# Patient Record
Sex: FEMALE | Race: BLACK OR AFRICAN AMERICAN | Employment: FULL TIME | URBAN - METROPOLITAN AREA
[De-identification: names, ages, dates, MRNs, and addresses within clinical notes are randomized per-mention and may not be internally consistent; named-entity substitution may affect disease eponyms.]

---

## 2017-01-25 ENCOUNTER — OP HISTORICAL/CONVERTED ENCOUNTER (OUTPATIENT)
Dept: OTHER | Age: 62
End: 2017-01-25

## 2018-08-17 RX ORDER — OMEPRAZOLE 20 MG/1
20 CAPSULE, DELAYED RELEASE ORAL
COMMUNITY

## 2018-08-17 RX ORDER — AMLODIPINE AND VALSARTAN 5; 160 MG/1; MG/1
1 TABLET ORAL DAILY
COMMUNITY

## 2018-08-17 RX ORDER — GABAPENTIN 300 MG/1
300 CAPSULE ORAL EVERY EVENING
COMMUNITY

## 2018-08-17 NOTE — PERIOP NOTES
[address]  Endoscopy Preprocedure Instructions      1. On the day of your surgery, please report to registration located on the 2nd floor of the  [facility]. yes    2. You must have a responsible adult to drive you to the hospital, stay at the hospital during your procedure and drive you home. If they leave your procedure will not be started (no exceptions). yes    3. Do not have anything to eat or drink (including water, gum, mints, coffee, and juice) after midnight. This does not apply to the medications you were instructed to take by your physician. yesIf you are currently taking Plavix, Coumadin, Aspirin, or other blood-thinning agents, contact your physician for special instructions. not applicable,    4. If you are having a procedure that requires bowel prep: We recommend that you have only clear liquids the day before your procedure and begin your bowel prep by 5:00 pm.  You may continue to drink clear liquids until midnight. If for any reason you are not able to complete your prep please notify your physician immediately. yes    5. Have a list of all current medications, including vitamins, herbal supplements and any other over the counter medications. yes    6. If you wear glasses, contacts, dentures and/or hearing aids, they may be removed prior to procedure, please bring a case to store them in. yes    7. You should understand that if you do not follow these instructions your procedure may be cancelled. If your physical condition changes (I.e. fever, cold or flu) please contact your doctor as soon as possible. 8. It is important that you be on time.   If for any reason you are unable to keep your appointment please call [phone] the day of or your physicians office prior to your scheduled procedure

## 2018-08-21 NOTE — H&P
57 y.o. female for open access colonoscopy for screening   Additional data for completion of the targeted pre-endoscopy H&P will be provided under 'H&P interval notes'. Please see that document which will be attached to this.   Laurie Bain MD    Last colon 2017 JOSE DE JESUS inadequate prep

## 2018-08-22 ENCOUNTER — HOSPITAL ENCOUNTER (OUTPATIENT)
Age: 63
Setting detail: OUTPATIENT SURGERY
Discharge: HOME | End: 2018-08-22
Attending: SPECIALIST | Admitting: SPECIALIST
Payer: COMMERCIAL

## 2018-08-22 ENCOUNTER — ANESTHESIA EVENT (OUTPATIENT)
Dept: ENDOSCOPY | Age: 63
End: 2018-08-22
Payer: COMMERCIAL

## 2018-08-22 ENCOUNTER — ANESTHESIA (OUTPATIENT)
Dept: ENDOSCOPY | Age: 63
End: 2018-08-22
Payer: COMMERCIAL

## 2018-08-22 VITALS
HEART RATE: 63 BPM | WEIGHT: 230 LBS | BODY MASS INDEX: 36.96 KG/M2 | OXYGEN SATURATION: 99 % | SYSTOLIC BLOOD PRESSURE: 113 MMHG | RESPIRATION RATE: 29 BRPM | TEMPERATURE: 97.6 F | HEIGHT: 66 IN | DIASTOLIC BLOOD PRESSURE: 64 MMHG

## 2018-08-22 PROCEDURE — 76060000031 HC ANESTHESIA FIRST 0.5 HR: Performed by: SPECIALIST

## 2018-08-22 PROCEDURE — 74011250636 HC RX REV CODE- 250/636

## 2018-08-22 PROCEDURE — 76040000019: Performed by: SPECIALIST

## 2018-08-22 RX ORDER — PROPOFOL 10 MG/ML
INJECTION, EMULSION INTRAVENOUS AS NEEDED
Status: DISCONTINUED | OUTPATIENT
Start: 2018-08-22 | End: 2018-08-22 | Stop reason: HOSPADM

## 2018-08-22 RX ORDER — SODIUM CHLORIDE 9 MG/ML
INJECTION, SOLUTION INTRAVENOUS
Status: DISCONTINUED | OUTPATIENT
Start: 2018-08-22 | End: 2018-08-22 | Stop reason: HOSPADM

## 2018-08-22 RX ORDER — FLUMAZENIL 0.1 MG/ML
0.2 INJECTION INTRAVENOUS
Status: DISCONTINUED | OUTPATIENT
Start: 2018-08-22 | End: 2018-08-22 | Stop reason: HOSPADM

## 2018-08-22 RX ORDER — PROPOFOL 10 MG/ML
INJECTION, EMULSION INTRAVENOUS
Status: DISCONTINUED | OUTPATIENT
Start: 2018-08-22 | End: 2018-08-22 | Stop reason: HOSPADM

## 2018-08-22 RX ORDER — DEXTROMETHORPHAN/PSEUDOEPHED 2.5-7.5/.8
1.2 DROPS ORAL
Status: DISCONTINUED | OUTPATIENT
Start: 2018-08-22 | End: 2018-08-22 | Stop reason: HOSPADM

## 2018-08-22 RX ORDER — SODIUM CHLORIDE 9 MG/ML
50 INJECTION, SOLUTION INTRAVENOUS CONTINUOUS
Status: DISCONTINUED | OUTPATIENT
Start: 2018-08-22 | End: 2018-08-22 | Stop reason: HOSPADM

## 2018-08-22 RX ORDER — MIDAZOLAM HYDROCHLORIDE 1 MG/ML
.25-5 INJECTION, SOLUTION INTRAMUSCULAR; INTRAVENOUS AS NEEDED
Status: DISCONTINUED | OUTPATIENT
Start: 2018-08-22 | End: 2018-08-22 | Stop reason: HOSPADM

## 2018-08-22 RX ORDER — ATROPINE SULFATE 0.1 MG/ML
0.5 INJECTION INTRAVENOUS
Status: DISCONTINUED | OUTPATIENT
Start: 2018-08-22 | End: 2018-08-22 | Stop reason: HOSPADM

## 2018-08-22 RX ORDER — NALOXONE HYDROCHLORIDE 0.4 MG/ML
0.4 INJECTION, SOLUTION INTRAMUSCULAR; INTRAVENOUS; SUBCUTANEOUS
Status: DISCONTINUED | OUTPATIENT
Start: 2018-08-22 | End: 2018-08-22 | Stop reason: HOSPADM

## 2018-08-22 RX ORDER — EPINEPHRINE 0.1 MG/ML
1 INJECTION INTRACARDIAC; INTRAVENOUS
Status: DISCONTINUED | OUTPATIENT
Start: 2018-08-22 | End: 2018-08-22 | Stop reason: HOSPADM

## 2018-08-22 RX ORDER — FENTANYL CITRATE 50 UG/ML
25 INJECTION, SOLUTION INTRAMUSCULAR; INTRAVENOUS AS NEEDED
Status: DISCONTINUED | OUTPATIENT
Start: 2018-08-22 | End: 2018-08-22 | Stop reason: HOSPADM

## 2018-08-22 RX ADMIN — PROPOFOL 140 MCG/KG/MIN: 10 INJECTION, EMULSION INTRAVENOUS at 09:17

## 2018-08-22 RX ADMIN — PROPOFOL 30 MG: 10 INJECTION, EMULSION INTRAVENOUS at 09:25

## 2018-08-22 RX ADMIN — PROPOFOL 70 MG: 10 INJECTION, EMULSION INTRAVENOUS at 09:17

## 2018-08-22 RX ADMIN — PROPOFOL 30 MG: 10 INJECTION, EMULSION INTRAVENOUS at 09:22

## 2018-08-22 RX ADMIN — SODIUM CHLORIDE: 9 INJECTION, SOLUTION INTRAVENOUS at 09:11

## 2018-08-22 RX ADMIN — PROPOFOL 30 MG: 10 INJECTION, EMULSION INTRAVENOUS at 09:27

## 2018-08-22 RX ADMIN — PROPOFOL 50 MG: 10 INJECTION, EMULSION INTRAVENOUS at 09:20

## 2018-08-22 NOTE — PROCEDURES
[address] [facility] GARRISON Valadez MD  [phone]      2018    Colonoscopy Procedure Note  [facility]  :  1955  [place] Medical Record Number: [id number]    Indications:     Screening colonoscopy  PCP:  Carolann Espinoza MD  Anesthesia/Sedation: Conscious Sedation/Moderate Sedation  Endoscopist:  Dr. Darrell Lnido  Complications:  None  Estimated Blood Loss:  None    Permit:  The indications, risks, benefits and alternatives were reviewed with the patient or their decision maker who was provided an opportunity to ask questions and all questions were answered. The specific risks of colonoscopy with conscious sedation were reviewed, including but not limited to anesthetic complication, bleeding, adverse drug reaction, missed lesion, infection, IV site reactions, and intestinal perforation which would lead to the need for surgical repair. Alternatives to colonoscopy including radiographic imaging, observation without testing, or laboratory testing were reviewed including the limitations of those alternatives. After considering the options and having all their questions answered, the patient or their decision maker provided both verbal and written consent to proceed. Procedure in Detail:  After obtaining informed consent, positioning of the patient in the left lateral decubitus position, and conduction of a pre-procedure pause or \"time out\" the endoscope was introduced into the anus and advanced to the cecum, which was identified by the ileocecal valve and appendiceal orifice. The quality of the colonic preparation was good. A careful inspection was made as the colonoscope was withdrawn, findings and interventions are described below. Findings: There is diverticulosis in the sigmoid colon without complications such as bleeding, inflammatory change, or luminal narrowing.   In the rectum, medium internal hemorrhoids are noted without bleeding. No polyps or other neoplasia. Specimens:    none    Complications:   None; patient tolerated the procedure well. Impression:  Normal colonoscopy to the cecum, with no evidence of neoplasia, diverticular disease, or mucosal abnormality. Recommendations:     - For colon cancer screening in this average-risk patient, colonoscopy may be repeated in 10 years. Thank you for entrusting me with this patient's care. Please do not hesitate to contact me with any questions or if I can be of assistance with any of your other patients' GI needs.     Signed By: Nakul Salas MD                        August 22, 2018

## 2018-08-22 NOTE — ANESTHESIA POSTPROCEDURE EVALUATION
Post-Anesthesia Evaluation and Assessment    Patient: Sarah Chambers MRN: [id number]  SSN: [id number]    YOB: 1955  Age: 57 y.o. Sex: female       Cardiovascular Function/Vital Signs  Visit Vitals    /64    Pulse 63    Temp 36.4 °C (97.6 °F)    Resp 29    Ht 5' 6\" (1.676 m)    Wt 104.3 kg (230 lb)    SpO2 99%    BMI 37.12 kg/m2       Patient is status post MAC anesthesia for Procedure(s):  COLONOSCOPY. Nausea/Vomiting: None    Postoperative hydration reviewed and adequate. Pain:  Pain Scale 1: Numeric (0 - 10) (08/22/18 0945)  Pain Intensity 1: 0 (08/22/18 0945)   Managed    Neurological Status: At baseline    Mental Status and Level of Consciousness: Arousable    Pulmonary Status:   O2 Device: Room air (08/22/18 0945)   Adequate oxygenation and airway patent    Complications related to anesthesia: None    Post-anesthesia assessment completed.  No concerns    Signed By: Claudine Goins MD     August 22, 2018

## 2018-08-22 NOTE — ROUTINE PROCESS
Antonio Shira  1955  [id number]    Situation:  Verbal report received from: Raeann Morgan RN  Procedure: Procedure(s):  COLONOSCOPY    Background:    Preoperative diagnosis: SCREENING COLONOSCOPY FOR COLONIC NEOPLASIA   Postoperative diagnosis: Diverticulosis, hemorrhoids    :  Dr. Jeannine Sheets  Assistant(s): Endoscopy Technician-1: Nelson Franco  Endoscopy RN-1: Raeann Morgan RN    Specimens: * No specimens in log *  H. Pylori  no    Assessment:  Intra-procedure medications   Anesthesia gave intra-procedure sedation and medications, see anesthesia flow sheet yes    Intravenous fluids: NS@ KVO     Vital signs stable     Abdominal assessment: round and soft     Recommendation:  Discharge patient per MD order  Family or Friend   Permission to share finding with family or friend yes

## 2018-08-22 NOTE — IP AVS SNAPSHOT
[address] 
[phone] Patient: Brenden Mckenzie MRN: [id number] :1955 About your hospitalization You were admitted on:  2018 You last received care in the:  [facility] ENDOSCOPY You were discharged on:  2018 Why you were hospitalized Your primary diagnosis was:  Not on File Follow-up Information None Your Scheduled Appointments 2018 COLONOSCOPY with Nakul Salas MD  
[facility] ENDOSCOPY (RI OR PRE ASSESSMENT)  
 [address]  
[phone] Park in designated visitor/patient parking. Enter through the main entrance, which is just to the left of the fountain. Once inside, go around the corner to the left. You will register in Outpatient Registration. Park in designated visitor/patient parking. Enter through the main entrance, which is just to the left of the fountain. Once inside, go around the corner to the left. You will register in Outpatient Registration. Discharge Orders None A check luda indicates which time of day the medication should be taken. My Medications CONTINUE taking these medications Instructions Each Dose to Equal  
 Morning Noon Evening Bedtime ADVAIR DISKUS 100-50 mcg/dose diskus inhaler Generic drug:  fluticasone-salmeterol Your last dose was: Your next dose is: Take 1 Puff by inhalation nightly. 1 Puff  
    
   
   
   
  
 albuterol 90 mcg/actuation inhaler Commonly known as:  PROVENTIL HFA, VENTOLIN HFA, PROAIR HFA Your last dose was: Your next dose is: Take 1 Puff by inhalation daily. 1 Puff EXFORGE 5-160 mg per tablet Generic drug:  amLODIPine-valsartan Your last dose was: Your next dose is: Take 1 Tab by mouth daily. 1 Tab  
    
   
   
   
  
 gabapentin 300 mg capsule Commonly known as:  NEURONTIN Your last dose was: Your next dose is: Take 300 mg by mouth three (3) times daily. Takes 1-2 times as needed 300 mg  
    
   
   
   
  
 omeprazole 20 mg capsule Commonly known as:  PRILOSEC Your last dose was: Your next dose is: Take 20 mg by mouth daily as needed. 20 mg  
    
   
   
   
  
 SINGULAIR 10 mg tablet Generic drug:  montelukast  
   
Your last dose was: Your next dose is: Take 10 mg by mouth daily. 10 mg Discharge Instructions [address]. Dev Berumen, [address] [place] [address] 
[phone] August 22, 2018 Torin Kemp YOB: 1955 COLONOSCOPY DISCHARGE INSTRUCTIONS If there is redness at IV site you should apply warm compress to area. If redness or soreness persist contact Dr. Dev Berumen' or your primary care doctor. There may be a slight amount of blood passed from the rectum. Gaseous discomfort may develop, but walking, belching will help relieve this. You may not operate a vehicle for 12 hours You may not operate machinery or dangerous appliances for rest of today You may not drink alcoholic beverages for 12 hours Avoid making any critical decisions for 24 hours DIET: 
You may resume your normal diet, but some patients find that heavy or large meals may lead to indigestion or vomiting. I suggest a light meal as first food intake.  
 
MEDICATIONS: 
The use of some over-the-counter pain medication may lead to bleeding after colon biopsies or polyp removal.  Tylenol (also called acetaminophen) is safe to take even if you have had colonoscopy with polyp removal.  Based on the procedure you had today you may safely take aspirin or aspirin-like products for the next ten (10) days. Remember that Tylenol (also called acetaminophen) is safe to take after colonoscopy even if you have had biopsies or polyps removed. ACTIVITY: 
You may resume your normal household activities, but it is recommended that you spend the remainder of the day resting -  avoid any strenuous activity. CALL DR. Mode Wilburn' OFFICE IF: Increasing pain, nausea, vomiting Abdominal distension (swelling) Significant new or increased bleeding (oral or rectal) Fever/Chills Chest pain/shortness of breath Additional instructions:  
Great news, no polyps or other serious problems identified today You should enjoy a diet high in fiber. Next colonoscopy 10 years. It was an honor to be your doctor today. Please let me or my office staff know if you have any feedback about today's procedure. Drake Adkins MD 
 
Colonoscopy saves lives, and can prevent colon cancer. Everyone aged 48 or older needs colonoscopy. Tell your family and friends: get the test! 
 
 
 
 
  
  
  
Introducing [facility] 24/7 As a [facility] patient, I wanted to make you aware of our electronic visit tool called [facility]. [facility] 24/7 allows you to connect within minutes with a medical provider 24 hours a day, seven days a week via a mobile device or tablet or logging into a secure website from your computer. You can access [facility]to Paredesfin from anywhere in the [place]. A virtual visit might be right for you when you have a simple condition and feel like you just dont want to get out of bed, or cant get away from work for an appointment, when your regular [facility] provider is not available (evenings, weekends or holidays), or when youre out of town and need minor care.   Electronic visits cost only $49 and if the Luis Alberto Otto provider determines a prescription is needed to treat your condition, one can be electronically transmitted to a nearby pharmacy*. Please take a moment to enroll today if you have not already done so. The enrollment process is free and takes just a few minutes. To enroll, please download the [employer] 24/7 victor manuel to your tablet or phone, or visit [email or web address] to enroll on your computer. And, as an [address] [place] [address] patient with a [facility] account, the results of your visits will be scanned into your electronic medical record and your primary care provider will be able to view the scanned results. We urge you to continue to see your regular [employer] provider for your ongoing medical care. And while your primary care provider may not be the one available when you seek a [facility] virtual visit, the peace of mind you get from getting a real diagnosis real time can be priceless. For more information on [facility], view our Frequently Asked Questions (FAQs) at [email or web address]. Sincerely, 
 
Vicki Webster MD 
Chief Medical Officer [address] [place] [address] *:  certain medications cannot be prescribed via [facility] Providers Seen During Your Hospitalization Provider Specialty Primary office phone Wojciech Thomas MD Gastroenterology [phone] Your Primary Care Physician (PCP) Primary Care Physician Office Phone Office Fax Alphonso Suazo [phone] You are allergic to the following Allergen Reactions Aspirin Other (comments) Only while on warfarin Recent Documentation Height Weight BMI OB Status Smoking Status 1.676 m 104.3 kg 37.12 kg/m2 Ablation Never Smoker Emergency Contacts Name Discharge Info Relation Home Work Mobile Harpreet Matt DISCHARGE CAREGIVER [3] Spouse [3] [phone] Patient Belongings The following personal items are in your possession at time of discharge: Dental Appliances: None  Visual Aid: At bedside, Glasses Please provide this summary of care documentation to your next provider. Signatures-by signing, you are acknowledging that this After Visit Summary has been reviewed with you and you have received a copy. Patient Signature:  ____________________________________________________________ Date:  ____________________________________________________________  
  
[facility] Provider Signature:  ____________________________________________________________ Date:  ____________________________________________________________

## 2018-08-22 NOTE — PERIOP NOTES
Patient tolerated procedure without problems. Abdomen soft and patient arousable and voices no complaints Report received from CRNA, see anesthesia note. Patient transported to endoscopy recovery area.   Endoscope was pre-cleaned at bedside immediately following procedure by Liudmila ZULETA

## 2018-08-22 NOTE — DISCHARGE INSTRUCTIONS
[address] [facility] GARRISON Coffey MD  [phone]      August 22, 2018    Aleksandr Mendoza  YOB: 1955    COLONOSCOPY DISCHARGE INSTRUCTIONS    If there is redness at IV site you should apply warm compress to area. If redness or soreness persist contact Dr. Moises Coffey' or your primary care doctor. There may be a slight amount of blood passed from the rectum. Gaseous discomfort may develop, but walking, belching will help relieve this. You may not operate a vehicle for 12 hours  You may not operate machinery or dangerous appliances for rest of today  You may not drink alcoholic beverages for 12 hours  Avoid making any critical decisions for 24 hours    DIET:  You may resume your normal diet, but some patients find that heavy or large meals may lead to indigestion or vomiting. I suggest a light meal as first food intake. MEDICATIONS:  The use of some over-the-counter pain medication may lead to bleeding after colon biopsies or polyp removal.  Tylenol (also called acetaminophen) is safe to take even if you have had colonoscopy with polyp removal.  Based on the procedure you had today you may safely take aspirin or aspirin-like products for the next ten (10) days. Remember that Tylenol (also called acetaminophen) is safe to take after colonoscopy even if you have had biopsies or polyps removed. ACTIVITY:  You may resume your normal household activities, but it is recommended that you spend the remainder of the day resting -  avoid any strenuous activity. CALL DR. Leo Garcia' OFFICE IF:  Increasing pain, nausea, vomiting  Abdominal distension (swelling)  Significant new or increased bleeding (oral or rectal)  Fever/Chills  Chest pain/shortness of breath                       Additional instructions:   Great news, no polyps or other serious problems identified today  You should enjoy a diet high in fiber.   Next colonoscopy 10 years. It was an honor to be your doctor today. Please let me or my office staff know if you have any feedback about today's procedure. David Zuluaga MD    Colonoscopy saves lives, and can prevent colon cancer. Everyone aged 48 or older needs colonoscopy.   Tell your family and friends: get the test!

## 2018-08-22 NOTE — INTERVAL H&P NOTE
Pre-Endoscopy H&P Update  Chief complaint/HPI/ROS:  The indication for the procedure, the patient's history and the patient's current medications are reviewed prior to the procedure and that data is reported on the H&P to which this document is attached. Any significant complaints with regard to organ systems will be noted, and if not mentioned then a review of systems is not contributory. Past Medical History:   Diagnosis Date    Asthma     GERD (gastroesophageal reflux disease)     Hypertension     Sleep apnea     cpap    Thromboembolus [facility])       Past Surgical History:   Procedure Laterality Date    HX GYN      Nova Sure Ablation    HX GYN  2003    hysteroscopy     HX OTHER SURGICAL  2017    colonoscopy    HX TUBAL LIGATION      VASCULAR SURGERY PROCEDURE UNLIST      blood clot in leg, vein closure     Social   Social History   Substance Use Topics    Smoking status: Never Smoker    Smokeless tobacco: Never Used    Alcohol use No      Family History   Problem Relation Age of Onset    Cancer Mother      multiple myeloma    Heart Disease Mother       Allergies   Allergen Reactions    Aspirin Other (comments)     Only while on warfarin      Prior to Admission Medications   Prescriptions Last Dose Informant Patient Reported? Taking? albuterol (PROVENTIL HFA, VENTOLIN HFA) 90 mcg/Actuation inhaler 8/21/2018 at Unknown time  Yes Yes   Sig: Take 1 Puff by inhalation daily. amLODIPine-valsartan (EXFORGE) 5-160 mg per tablet 8/21/2018 at Unknown time  Yes Yes   Sig: Take 1 Tab by mouth daily. fluticasone-salmeterol (ADVAIR DISKUS) 100-50 mcg/dose diskus inhaler 8/21/2018 at Unknown time  Yes Yes   Sig: Take 1 Puff by inhalation nightly.   gabapentin (NEURONTIN) 300 mg capsule 8/21/2018 at Unknown time  Yes Yes   Sig: Take 300 mg by mouth three (3) times daily. Takes 1-2 times as needed   montelukast (SINGULAIR) 10 mg tablet 8/21/2018 at Unknown time  Yes Yes   Sig: Take 10 mg by mouth daily. omeprazole (PRILOSEC) 20 mg capsule 8/20/2018  Yes Yes   Sig: Take 20 mg by mouth daily as needed. Facility-Administered Medications: None       PHYSICAL EXAM:  The patient is examined immediately prior to the procedure. Visit Vitals    /74    Pulse 71    Temp 98.2 °F (36.8 °C)    Resp 20    Ht 5' 6\" (1.676 m)    Wt 104.3 kg (230 lb)    SpO2 96%    BMI 37.12 kg/m2     Gen: Appears comfortable, no distress. Pulm: comfortable respirations with no abnormal audible breath sounds  CV: heart regular, well perfused  GI: abdomen flat. PLAN:  Informed consent discussion held, patient afforded an opportunity to ask questions and all questions answered. After being advised of the risks, benefits, and alternatives, the patient requested that we proceed and indicated so on a written consent form. Will proceed with procedure as planned.   Homa Villa MD

## 2018-08-22 NOTE — ANESTHESIA PREPROCEDURE EVALUATION
Anesthetic History               Review of Systems / Medical History  Patient summary reviewed and pertinent labs reviewed    Pulmonary        Sleep apnea    Asthma        Neuro/Psych              Cardiovascular    Hypertension              Exercise tolerance: >4 METS     GI/Hepatic/Renal     GERD           Endo/Other        Morbid obesity     Other Findings              Physical Exam    Airway  Mallampati: I  TM Distance: 4 - 6 cm  Neck ROM: normal range of motion   Mouth opening: Normal     Cardiovascular    Rhythm: regular  Rate: normal         Dental    Dentition: Caps/crowns, Lower dentition intact and Upper dentition intact     Pulmonary  Breath sounds clear to auscultation               Abdominal  GI exam deferred       Other Findings            Anesthetic Plan    ASA: 3  Anesthesia type: MAC            Anesthetic plan and risks discussed with: Patient

## 2019-03-08 ENCOUNTER — OP HISTORICAL/CONVERTED ENCOUNTER (OUTPATIENT)
Dept: OTHER | Age: 64
End: 2019-03-08

## 2021-09-03 ENCOUNTER — OFFICE VISIT (OUTPATIENT)
Dept: ENDOCRINOLOGY | Age: 66
End: 2021-09-03
Payer: MEDICARE

## 2021-09-03 VITALS
OXYGEN SATURATION: 97 % | DIASTOLIC BLOOD PRESSURE: 73 MMHG | BODY MASS INDEX: 40.27 KG/M2 | RESPIRATION RATE: 16 BRPM | WEIGHT: 250.6 LBS | HEIGHT: 66 IN | HEART RATE: 67 BPM | TEMPERATURE: 98.3 F | SYSTOLIC BLOOD PRESSURE: 146 MMHG

## 2021-09-03 DIAGNOSIS — E11.65 UNCONTROLLED TYPE 2 DIABETES MELLITUS WITH HYPERGLYCEMIA (HCC): Primary | ICD-10-CM

## 2021-09-03 DIAGNOSIS — E78.2 MIXED HYPERLIPIDEMIA: ICD-10-CM

## 2021-09-03 DIAGNOSIS — I10 ESSENTIAL HYPERTENSION: ICD-10-CM

## 2021-09-03 LAB — HBA1C MFR BLD HPLC: 7 %

## 2021-09-03 PROCEDURE — 3051F HG A1C>EQUAL 7.0%<8.0%: CPT | Performed by: INTERNAL MEDICINE

## 2021-09-03 PROCEDURE — 1101F PT FALLS ASSESS-DOCD LE1/YR: CPT | Performed by: INTERNAL MEDICINE

## 2021-09-03 PROCEDURE — 3017F COLORECTAL CA SCREEN DOC REV: CPT | Performed by: INTERNAL MEDICINE

## 2021-09-03 PROCEDURE — G8510 SCR DEP NEG, NO PLAN REQD: HCPCS | Performed by: INTERNAL MEDICINE

## 2021-09-03 PROCEDURE — 83036 HEMOGLOBIN GLYCOSYLATED A1C: CPT | Performed by: INTERNAL MEDICINE

## 2021-09-03 PROCEDURE — 2022F DILAT RTA XM EVC RTNOPTHY: CPT | Performed by: INTERNAL MEDICINE

## 2021-09-03 PROCEDURE — 99204 OFFICE O/P NEW MOD 45 MIN: CPT | Performed by: INTERNAL MEDICINE

## 2021-09-03 PROCEDURE — G8536 NO DOC ELDER MAL SCRN: HCPCS | Performed by: INTERNAL MEDICINE

## 2021-09-03 PROCEDURE — G8427 DOCREV CUR MEDS BY ELIG CLIN: HCPCS | Performed by: INTERNAL MEDICINE

## 2021-09-03 PROCEDURE — 1090F PRES/ABSN URINE INCON ASSESS: CPT | Performed by: INTERNAL MEDICINE

## 2021-09-03 PROCEDURE — G8400 PT W/DXA NO RESULTS DOC: HCPCS | Performed by: INTERNAL MEDICINE

## 2021-09-03 PROCEDURE — G8417 CALC BMI ABV UP PARAM F/U: HCPCS | Performed by: INTERNAL MEDICINE

## 2021-09-03 RX ORDER — METFORMIN HYDROCHLORIDE 500 MG/1
500 TABLET ORAL 2 TIMES DAILY WITH MEALS
Qty: 180 TABLET | Refills: 3 | Status: SHIPPED | OUTPATIENT
Start: 2021-09-03 | End: 2022-09-03

## 2021-09-03 RX ORDER — DESLORATADINE 5 MG/1
TABLET ORAL
COMMUNITY
End: 2021-09-03

## 2021-09-03 RX ORDER — MELATONIN: COMMUNITY

## 2021-09-03 RX ORDER — LORATADINE 10 MG/1
TABLET ORAL
COMMUNITY

## 2021-09-03 RX ORDER — BUDESONIDE AND FORMOTEROL FUMARATE DIHYDRATE 160; 4.5 UG/1; UG/1
AEROSOL RESPIRATORY (INHALATION)
COMMUNITY
Start: 2021-08-16

## 2021-09-03 RX ORDER — ATORVASTATIN CALCIUM 10 MG/1
10 TABLET, FILM COATED ORAL DAILY
COMMUNITY
Start: 2021-03-31 | End: 2022-07-12

## 2021-09-03 RX ORDER — METOPROLOL SUCCINATE 25 MG/1
12.5 TABLET, EXTENDED RELEASE ORAL DAILY
COMMUNITY

## 2021-09-03 RX ORDER — METFORMIN HYDROCHLORIDE 500 MG/1
500 TABLET ORAL 2 TIMES DAILY
COMMUNITY
Start: 2020-12-18 | End: 2021-09-03 | Stop reason: SDUPTHER

## 2021-09-03 NOTE — PROGRESS NOTES
1. Have you been to the ER, urgent care clinic since your last visit? Hospitalized since your last visit? No    2. Have you seen or consulted any other health care providers outside of the [address] [place] [address] since your last visit? Include any pap smears or colon screening.  No

## 2021-09-03 NOTE — LETTER
9/5/2021    Patient: Vanessa Valente   YOB: 1955   Date of Visit: 9/3/2021     Mary Silveira [facility] [address]  Via Fax: [phone]    Dear DELL Silveira,      Thank you for referring Ms. Starla Dia to [facility]OLOGY for evaluation. My notes for this consultation are attached. If you have questions, please do not hesitate to call me. I look forward to following your patient along with you.       Sincerely,    Fan Livingston MD

## 2021-09-03 NOTE — PATIENT INSTRUCTIONS
SPECIFIC INSTRUCTIONS BELOW       STAY ON  METFORMIN AS BEFORE     USE FOREST 2 SYSTEM -  WILL BUY IT         -----------------------------------------------------------------------------        DRINK water   Do not skip meals  Do not eat in between meals    Reduce carbs- pasta, rice, potatoes, bread   Try to avoid processed bread products like BISCUITS, CROISSANTS, MUFFINS    Do not drink juices or sodas, even if they are calorie zero or diet drinks and especially avoid using powders like crystalloids , KHALIDA-AIDS     Do not eat peanut butter     Do not eat sugar free cookies and cakes   Do not eat peaches, oranges, pineapples, raisins, grapes , canteloupe , honey dew, mangoes , watermelon  and fruit medleys        -------------PAY ATTENTION TO THESE GENERAL INSTRUCTIONS -----------------    -ANY tests other than blood work, which you opt to do  outside the  [facility] imaging facilities, you are responsible for prior authorizations if  required    - HEALTH MAINTENANCE IS NOT GOING TO BE UP TO DATE ON YOUR AVS- PLEASE IGNORE   - YOUR MED LIST IS NOT UP TO DATE AS SOME CHANGES ARE BEING MADE AFTER THE VISIT - FOLLOW SPECIFIC INSTRUCTIONS  ABOVE     Results     *Normal results will not be notified by a phone call starting January 1 2021   *If you have an upcoming visit, the results will be discussed at the visit   *Please sign up for MY CHART if you want access to your lab and test results  *Abnormal results which require immediate attention will be notified by phone call   *Abnormal results which do not require immediate assistance will be notified in 1-2 weeks       Refills    -    have your pharmacy send us a refill request . Refills are done max for one year and a visit is a must before refills are extended    Follow up appointments -  highly encourage you to make it when you are checking out. We can accommodate you into the schedule based on your clinical situation, but not for extending refills beyond a year. Labs are important to give refills and is important to get labs before the visit     Phone calls  -  Allow  24 hrs.  for non-urgent calls to be returned  Prior authorization - It may take 2-4 weeks to process  Forms  -  FMLA, DMV etc., will take up to 2 weeks to process  Cancellations - please notify the office 2 days in advance   Samples  - will only be dispensed at visits       If not showing for the appointments and cancelling appointments within 24 hours are kept track of and three  of such situations in  two consecutive years will likely be considered for termination from the practice    -------------------------------------------------------------------------------------------------------------------

## 2021-09-03 NOTE — PROGRESS NOTES
Care Diabetes [facility]inology  Binh Bradley MD, Kanu Vela is a 77 y.o. female presents today as a new DM Patient. HPI    Patient here for initial visit of Type 2 diabetes mellitus . Referred : by self/pcp    H/o diabetes for SEVERAL  years     Current A1C is 7 %   and symptoms/problems include wants to achieve  a1c of 6 %   She EXERCISES AND EATS VERY WELL     Current diabetic medications include metformin er 500 mg bid . Current monitoring regimen: home blood tests - daily  Home blood sugar records: trend: fluctuating a bit  Any episodes of hypoglycemia? no    Weight trend: fluctuating a lot  Prior visit with dietician: no  Current diet: \"unhealthy\" diet in general  Current exercise: no regular exercise    Known diabetic complications: none  Cardiovascular risk factors: dyslipidemia, diabetes mellitus    Eye exam current (within one year): yes      Past Medical History:   Diagnosis Date    Asthma     Diabetes ([address]     GERD (gastroesophageal reflux disease)     Hypertension     Sleep apnea     cpap    Thromboembolus [facility])      Past Surgical History:   Procedure Laterality Date    COLONOSCOPY N/A 8/22/2018    COLONOSCOPY performed by Jacklyn Miles MD at [place] [address] HX GYN  2003    hysteroscopy     HX OTHER SURGICAL  2017    colonoscopy    HX TUBAL LIGATION      VASCULAR SURGERY PROCEDURE UNLIST      blood clot in leg, vein closure     Current Outpatient Medications   Medication Sig    budesonide-formoteroL (SYMBICORT) 160-4.5 mcg/actuation HFAA INHALE 2 PUFFS BY MOUTH TWICE DAILY FOR ASTHMA IF NOT TAKING ADVAIR    cholecalciferol (VITAMIN D3) (1000 Units /25 mcg) tablet Vitamin D3 25 mcg (1,000 unit) tablet    loratadine (CLARITIN) 10 mg tablet loratadine 10 mg tablet    metFORMIN (GLUCOPHAGE) 500 mg tablet Take 500 mg by mouth two (2) times a day.  atorvastatin (LIPITOR) 10 mg tablet Take 10 mg by mouth daily.     metoprolol succinate (TOPROL-XL) 25 mg XL tablet Take 12.5 mg by mouth daily.  gabapentin (NEURONTIN) 300 mg capsule Take 300 mg by mouth every evening.  amLODIPine-valsartan (EXFORGE) 5-160 mg per tablet Take 1 Tab by mouth daily.  omeprazole (PRILOSEC) 20 mg capsule Take 20 mg by mouth daily as needed.  montelukast (SINGULAIR) 10 mg tablet Take 10 mg by mouth daily.  albuterol (PROVENTIL HFA, VENTOLIN HFA) 90 mcg/Actuation inhaler Take 1 Puff by inhalation daily as needed. No current facility-administered medications for this visit. Review of Systems   Constitutional: Negative. HENT: Negative. Eyes: Negative. Respiratory: Negative. Cardiovascular: Negative. Gastrointestinal: Negative. Genitourinary: Negative. Musculoskeletal: Negative. Skin: Negative. Neurological: Negative. Endo/Heme/Allergies: Negative. Psychiatric/Behavioral: Negative. Vitals:    09/03/21 1343   BP: (!) 146/73   BP 1 Location: Left upper arm   BP Patient Position: Sitting   BP Cuff Size: Large adult   Pulse: 67   Temp: 98.3 °F (36.8 °C)   TempSrc: Temporal   Resp: 16   Height: 5' 6\" (1.676 m)   Weight: 250 lb 9.6 oz (113.7 kg)   SpO2: 97%        Physical Exam  Constitutional:       Appearance: She is well-developed. HENT:      Head: Normocephalic and atraumatic. Eyes:      Conjunctiva/sclera: Conjunctivae normal.      Pupils: Pupils are equal, round, and reactive to light. Cardiovascular:      Rate and Rhythm: Normal rate and regular rhythm. Pulmonary:      Effort: Pulmonary effort is normal.      Breath sounds: Normal breath sounds. Abdominal:      General: Bowel sounds are normal.      Palpations: Abdomen is soft. Musculoskeletal:         General: Normal range of motion. Cervical back: Normal range of motion and neck supple. Skin:     General: Skin is warm and dry.       Comments: Has acanthosis on neck and under arms   Neurological:      Mental Status: She is alert and oriented to person, place, and time. Deep Tendon Reflexes: Reflexes are normal and symmetric. [] Recent labs have been reviewed from referring provider. [] Recent labs were not available at time of visit. Assessment and Plan     1. Type 2 DM uncontrolled : a1c is 7 %   Reviewed the glucose log : no    Stay on metformin   Patient is advised to check blood sugars 1-2  times daily by rotation method. reviewed medications and side effects in detail  lab results and schedule of future lab studies reviewed with patient    specific diabetic recommendations: diabetic diet discussed in detail, written exchange diet given, low cholesterol diet, weight control and daily exercise discussed, home glucose monitoring emphasized, home glucose monitoring demonstrated and taught and glucose meter dispensed to patient    2. Hypoglycemia :  Educated on treating the hypoglycemia. 3. HTN : continue toprol xl  And exforge   4. Dyslipidemia : continue lipitor    5. use of aspirin to prevent MI and TIA's discussed      6. Diabetic complications :     A. Retinopathy-YES   educated on this complication,  regular f/u with ophthalmologist encouraged    B. Nephropathy - YES     educated on this disease and indicated stages and its prognosis. Regular care with nephrologist encouraged    C.  Peripheral Neuropathy - YES  , mild  educated on this disease and indicated improvement with good and stable glycemic control        Reviewed results with patient and discussed the labs being ordered today/bnv  Patient voiced understanding of plan of care

## 2022-01-07 ENCOUNTER — OFFICE VISIT (OUTPATIENT)
Dept: ENDOCRINOLOGY | Age: 67
End: 2022-01-07
Payer: COMMERCIAL

## 2022-01-07 VITALS
SYSTOLIC BLOOD PRESSURE: 142 MMHG | OXYGEN SATURATION: 97 % | HEART RATE: 69 BPM | WEIGHT: 221.6 LBS | DIASTOLIC BLOOD PRESSURE: 63 MMHG | TEMPERATURE: 97.2 F | HEIGHT: 66 IN | BODY MASS INDEX: 35.62 KG/M2

## 2022-01-07 DIAGNOSIS — I10 ESSENTIAL HYPERTENSION: ICD-10-CM

## 2022-01-07 DIAGNOSIS — E78.2 MIXED HYPERLIPIDEMIA: ICD-10-CM

## 2022-01-07 DIAGNOSIS — E11.65 UNCONTROLLED TYPE 2 DIABETES MELLITUS WITH HYPERGLYCEMIA (HCC): Primary | ICD-10-CM

## 2022-01-07 PROCEDURE — G8400 PT W/DXA NO RESULTS DOC: HCPCS | Performed by: INTERNAL MEDICINE

## 2022-01-07 PROCEDURE — G8753 SYS BP > OR = 140: HCPCS | Performed by: INTERNAL MEDICINE

## 2022-01-07 PROCEDURE — G8754 DIAS BP LESS 90: HCPCS | Performed by: INTERNAL MEDICINE

## 2022-01-07 PROCEDURE — G8417 CALC BMI ABV UP PARAM F/U: HCPCS | Performed by: INTERNAL MEDICINE

## 2022-01-07 PROCEDURE — G8536 NO DOC ELDER MAL SCRN: HCPCS | Performed by: INTERNAL MEDICINE

## 2022-01-07 PROCEDURE — 3046F HEMOGLOBIN A1C LEVEL >9.0%: CPT | Performed by: INTERNAL MEDICINE

## 2022-01-07 PROCEDURE — G8427 DOCREV CUR MEDS BY ELIG CLIN: HCPCS | Performed by: INTERNAL MEDICINE

## 2022-01-07 PROCEDURE — 1090F PRES/ABSN URINE INCON ASSESS: CPT | Performed by: INTERNAL MEDICINE

## 2022-01-07 PROCEDURE — G8432 DEP SCR NOT DOC, RNG: HCPCS | Performed by: INTERNAL MEDICINE

## 2022-01-07 PROCEDURE — 1101F PT FALLS ASSESS-DOCD LE1/YR: CPT | Performed by: INTERNAL MEDICINE

## 2022-01-07 PROCEDURE — 2022F DILAT RTA XM EVC RTNOPTHY: CPT | Performed by: INTERNAL MEDICINE

## 2022-01-07 PROCEDURE — 99214 OFFICE O/P EST MOD 30 MIN: CPT | Performed by: INTERNAL MEDICINE

## 2022-01-07 PROCEDURE — 3017F COLORECTAL CA SCREEN DOC REV: CPT | Performed by: INTERNAL MEDICINE

## 2022-01-07 NOTE — PATIENT INSTRUCTIONS
SPECIFIC INSTRUCTIONS BELOW     STAY ON  METFORMIN AS BEFORE    From pcp         -------------PAY ATTENTION TO THESE GENERAL INSTRUCTIONS -----------------      - The medications prescribed at this visit will not be available at pharmacy until 6 pm       - YOUR MED LIST IS NOT UP TO DATE AS SOME CHANGES ARE BEING MADE AFTER THE VISIT - FOLLOW SPECIFIC INSTRUCTIONS  ABOVE     -ANY tests other than blood work, which you opt to do  outside the  [facility] facilities, you are responsible for prior authorizations if  required    - [address] Isaac[address]rea UP TO DATE ON YOUR AVS- PLEASE IGNORE     Results     *Normal results will not be notified by a phone call starting January 1 2021   *If you have an upcoming visit, the results will be discussed at the visit   *Please sign up for MY CHART if you want access to your lab and test results  *Abnormal results which require immediate attention will be notified by phone call   *Abnormal results which do not require immediate assistance will be notified in 1-2 weeks       Refills    -    have your pharmacy send us a refill request . Refills are done max for one year and a visit is a must before refills are extended    Follow up appointments -  highly encourage you to make it when you are checking out. We can accommodate you into the schedule based on your clinical situation, but not for extending refills beyond a year. Labs are important to give refills and is important to get labs before the visit     Phone calls  -  Allow  24 hrs.  for non-urgent calls to be returned  Prior authorization - It may take 2-4 weeks to process  Forms  -  FMLA, DMV etc., will take up to 2 weeks to process  Cancellations - please notify the office 2 days in advance   Samples  - will only be dispensed at visits       If not showing for the appointments and cancelling appointments within 24 hours are kept track of and three  of such situations in  two consecutive years will likely be considered for termination from the practice    -------------------------------------------------------------------------------------------------------------------

## 2022-01-07 NOTE — LETTER
1/9/2022    Patient: Nelida Parker   YOB: 1955   Date of Visit: 1/7/2022     Mary Cali [address] [place] [id number]  Via Fax: [phone]    Dear DELL Cali,      Thank you for referring Ms. Carla Cole to [facility] for evaluation. My notes for this consultation are attached. If you have questions, please do not hesitate to call me. I look forward to following your patient along with you.       Sincerely,    Vivek Piedra MD

## 2022-01-07 NOTE — PROGRESS NOTES
Care Diabetes and Endocrinology  Princess Helen SINGH, Junior Resendiz is a 74 y.o. female presents today as a new DM Patient. HPI    Patient here for   FIRST VISIT  AFTER   initial visit of Type 2 diabetes mellitus   From  Sept 2021      LOST 27 LBS             Sept 2021     Referred : by self/pcp    H/o diabetes for SEVERAL  years   Current A1C is 7 %   and symptoms/problems include wants to achieve  a1c of 6 %   She EXERCISES AND EATS VERY WELL   Current diabetic medications include metformin er 500 mg bid . Current monitoring regimen: home blood tests - daily  Home blood sugar records: trend: fluctuating a bit  Any episodes of hypoglycemia? no    Weight trend: fluctuating a lot  Prior visit with dietician: no  Current diet: \"unhealthy\" diet in general  Current exercise: no regular exercise  Known diabetic complications: none  Cardiovascular risk factors: dyslipidemia, diabetes mellitus              Vitals:    01/07/22 0952   BP: (!) 142/63   BP 1 Location: Left upper arm   BP Patient Position: Sitting   BP Cuff Size: Adult long   Pulse: 69   Temp: 97.2 °F (36.2 °C)   TempSrc: Temporal   Height: 5' 6\" (1.676 m)   Weight: 221 lb 9.6 oz (100.5 kg)   SpO2: 97%         Review of Systems   None       Physical Exam   Constitutional: She is oriented to person, place, and time. She appears well-developed and well-nourished. Psychiatric: She has a normal mood and affect.              Lab Results   Component Value Date/Time    Hemoglobin A1c 6.8 (H) 12/30/2021 12:00 AM    Glucose 113 (H) 12/30/2021 12:00 AM    Microalb/Creat ratio (ug/mg creat.) 22 12/30/2021 12:00 AM    LDL, calculated 55 12/30/2021 12:00 AM    Creatinine 0.74 12/30/2021 12:00 AM      Lab Results   Component Value Date/Time    Cholesterol, total 119 12/30/2021 12:00 AM    HDL Cholesterol 47 12/30/2021 12:00 AM    LDL, calculated 55 12/30/2021 12:00 AM    Triglyceride 87 12/30/2021 12:00 AM     Lab Results   Component Value Date/Time ALT (SGPT) 25 12/30/2021 12:00 AM    Alk. phosphatase 77 12/30/2021 12:00 AM    Bilirubin, total 0.4 12/30/2021 12:00 AM    Albumin 4.2 12/30/2021 12:00 AM    Protein, total 7.7 12/30/2021 12:00 AM     Lab Results   Component Value Date/Time    GFR est non-AA 84 12/30/2021 12:00 AM    GFR est AA 96 12/30/2021 12:00 AM    Creatinine 0.74 12/30/2021 12:00 AM    BUN 16 12/30/2021 12:00 AM    Sodium 141 12/30/2021 12:00 AM    Potassium 4.7 12/30/2021 12:00 AM    Chloride 101 12/30/2021 12:00 AM    CO2 26 12/30/2021 12:00 AM         Assessment and Plan     1. Type 2 DM uncontrolled : a1c is   6.8 %       From     Dec 2021       Compared to   Sept 2021   7 %   Reviewed the glucose log : no      Jan 2022 -   She lost 30 lbs with pure motivation and nto taking help of  Weight loss meds   coMmended her   Stay on metformin     Patient is advised to check blood sugars 1-2  times daily by rotation method. reviewed medications and side effects in detail  lab results and schedule of future lab studies reviewed with patient    2. Hypoglycemia :  Educated on treating the hypoglycemia. 3. HTN : continue toprol xl  And exforge   4. Dyslipidemia : continue lipitor    5. use of aspirin to prevent MI and TIA's discussed      6. Diabetic complications :     A. Retinopathy-YES   educated on this complication,  regular f/u with ophthalmologist encouraged    B. Nephropathy - YES     educated on this disease and indicated stages and its prognosis. Regular care with nephrologist encouraged    C.  Peripheral Neuropathy - YES  , mild  educated on this disease and indicated improvement with good and stable glycemic control      F/U IN A YEAR    Reviewed results with patient and discussed the labs being ordered today/bnv  Patient voiced understanding of plan of care

## 2022-01-07 NOTE — PROGRESS NOTES
Nelly Stockton is a 74 y.o. female here for   Chief Complaint   Patient presents with    Diabetes       1. Have you been to the ER, urgent care clinic since your last visit? Hospitalized since your last visit? - no    2. Have you seen or consulted any other health care providers outside of the [address] [place] [address] since your last visit?   Include any pap smears or colon screening.-no

## 2022-12-21 ENCOUNTER — OFFICE VISIT (OUTPATIENT)
Dept: ENDOCRINOLOGY | Age: 67
End: 2022-12-21
Payer: MEDICARE

## 2022-12-21 VITALS
TEMPERATURE: 97.9 F | BODY MASS INDEX: 28.48 KG/M2 | HEIGHT: 66 IN | RESPIRATION RATE: 18 BRPM | HEART RATE: 57 BPM | DIASTOLIC BLOOD PRESSURE: 54 MMHG | WEIGHT: 177.2 LBS | SYSTOLIC BLOOD PRESSURE: 121 MMHG

## 2022-12-21 DIAGNOSIS — I10 ESSENTIAL HYPERTENSION: ICD-10-CM

## 2022-12-21 DIAGNOSIS — E11.65 UNCONTROLLED TYPE 2 DIABETES MELLITUS WITH HYPERGLYCEMIA (HCC): Primary | ICD-10-CM

## 2022-12-21 DIAGNOSIS — E78.2 MIXED HYPERLIPIDEMIA: ICD-10-CM

## 2022-12-21 PROCEDURE — 3044F HG A1C LEVEL LT 7.0%: CPT | Performed by: INTERNAL MEDICINE

## 2022-12-21 PROCEDURE — G8400 PT W/DXA NO RESULTS DOC: HCPCS | Performed by: INTERNAL MEDICINE

## 2022-12-21 PROCEDURE — 1090F PRES/ABSN URINE INCON ASSESS: CPT | Performed by: INTERNAL MEDICINE

## 2022-12-21 PROCEDURE — 99213 OFFICE O/P EST LOW 20 MIN: CPT | Performed by: INTERNAL MEDICINE

## 2022-12-21 PROCEDURE — 1101F PT FALLS ASSESS-DOCD LE1/YR: CPT | Performed by: INTERNAL MEDICINE

## 2022-12-21 PROCEDURE — 3017F COLORECTAL CA SCREEN DOC REV: CPT | Performed by: INTERNAL MEDICINE

## 2022-12-21 PROCEDURE — 3074F SYST BP LT 130 MM HG: CPT | Performed by: INTERNAL MEDICINE

## 2022-12-21 PROCEDURE — 2022F DILAT RTA XM EVC RTNOPTHY: CPT | Performed by: INTERNAL MEDICINE

## 2022-12-21 PROCEDURE — G8427 DOCREV CUR MEDS BY ELIG CLIN: HCPCS | Performed by: INTERNAL MEDICINE

## 2022-12-21 PROCEDURE — G8417 CALC BMI ABV UP PARAM F/U: HCPCS | Performed by: INTERNAL MEDICINE

## 2022-12-21 PROCEDURE — 1123F ACP DISCUSS/DSCN MKR DOCD: CPT | Performed by: INTERNAL MEDICINE

## 2022-12-21 PROCEDURE — G8510 SCR DEP NEG, NO PLAN REQD: HCPCS | Performed by: INTERNAL MEDICINE

## 2022-12-21 PROCEDURE — 3078F DIAST BP <80 MM HG: CPT | Performed by: INTERNAL MEDICINE

## 2022-12-21 PROCEDURE — G8536 NO DOC ELDER MAL SCRN: HCPCS | Performed by: INTERNAL MEDICINE

## 2022-12-21 NOTE — LETTER
12/22/2022    Patient: Ivette Gorman   YOB: 1955   Date of Visit: 12/21/2022     Mary Zurita [address] [place] [id number]  Via Fax: [phone]    Dear DELL Zurita,      Thank you for referring Ms. Cuauhtemoc Bhat to [facility] for evaluation. My notes for this consultation are attached. If you have questions, please do not hesitate to call me. I look forward to following your patient along with you.       Sincerely,    Princess Analilia MD

## 2022-12-21 NOTE — PROGRESS NOTES
Care Diabetes and Endocrinology  Citlaly Luo MD, Christopher Glenna is a 69 y.o. female presents today as a follow up     Diabetes      Patient here for   FIRST VISIT  AFTER   initial visit of Type 2 diabetes mellitus   From  jan 2022     LOST 43 lbs ( she does ДМИТРИЙ diet )         Jan 2022     LOST 30 LBS         Sept 2021     Referred : by self/pcp  H/o diabetes for SEVERAL  years   Current A1C is 7 %   and symptoms/problems include wants to achieve  a1c of 6 %   She EXERCISES AND EATS VERY WELL   Current diabetic medications include metformin er 500 mg bid . Current monitoring regimen: home blood tests - daily  Home blood sugar records: trend: fluctuating a bit  Any episodes of hypoglycemia? no  Weight trend: fluctuating a lot  Prior visit with dietician: no  Current diet: \"unhealthy\" diet in general  Current exercise: no regular exercise  Known diabetic complications: none  Cardiovascular risk factors: dyslipidemia, diabetes mellitus              Vitals:    12/21/22 1031   BP: (!) 121/54   BP 1 Location: Left upper arm   BP Patient Position: Sitting   BP Cuff Size: Large adult   Pulse: (!) 57   Temp: 97.9 °F (36.6 °C)   TempSrc: Temporal   Resp: 18   Height: 5' 6\" (1.676 m)   Weight: 177 lb 3.2 oz (80.4 kg)         Review of Systems   LOST weight       Physical Exam   Constitutional: She is oriented to person, place, and time. She appears well-developed and well-nourished. Psychiatric: She has a normal mood and affect.              Lab Results   Component Value Date/Time    Hemoglobin A1c 5.8 (H) 12/13/2022 12:00 AM    Hemoglobin A1c 6.8 (H) 12/30/2021 12:00 AM    Hemoglobin A1c, External 7.0 03/20/2021 12:00 AM    Glucose 77 12/13/2022 12:00 AM    Microalb/Creat ratio (ug/mg creat.) 14 12/13/2022 12:00 AM    LDL, calculated 50 12/13/2022 12:00 AM    Creatinine 0.63 12/13/2022 12:00 AM      Lab Results   Component Value Date/Time    Cholesterol, total 128 12/13/2022 12:00 AM    HDL Cholesterol 65 12/13/2022 12:00 AM    LDL, calculated 50 12/13/2022 12:00 AM    LDL-C, External 62 03/20/2021 12:00 AM    Triglyceride 60 12/13/2022 12:00 AM     Lab Results   Component Value Date/Time    ALT (SGPT) 13 12/13/2022 12:00 AM    Alk. phosphatase 79 12/13/2022 12:00 AM    Bilirubin, total 0.5 12/13/2022 12:00 AM    Albumin 4.4 12/13/2022 12:00 AM    Protein, total 7.6 12/13/2022 12:00 AM     Lab Results   Component Value Date/Time    GFR est non-AA 84 12/30/2021 12:00 AM    GFR est AA 96 12/30/2021 12:00 AM    Creatinine 0.63 12/13/2022 12:00 AM    BUN 19 12/13/2022 12:00 AM    Sodium 144 12/13/2022 12:00 AM    Potassium 4.6 12/13/2022 12:00 AM    Chloride 98 12/13/2022 12:00 AM    CO2 28 12/13/2022 12:00 AM         Assessment and Plan     1. Type 2 DM uncontrolled : a1c is  5.8 %  from dec 2022   compared to    6.8 %       From     Dec 2021       Compared to   Sept 2021   7 %     Dec 2022 - amazing weight loss  with Christina Bolster diet  ( 70 lb weight loss  in  2 years )   She does natural  foods  also   She looks so confident   She still can stay on metformin   ( low pancreatic function , staying pre-diabetic )       Jan 2022 -   She lost 30 lbs with pure motivation and nto taking help of  Weight loss meds   coMmended her   Stay on metformin     2. Hypoglycemia :  Educated on treating the hypoglycemia. 3. HTN : continue toprol xl  And exforge     4. Dyslipidemia : continue lipitor    5. use of aspirin to prevent MI and TIA's discussed      6. Diabetic complications :     A. Retinopathy-no   educated on this complication,  regular f/u with ophthalmologist encouraged    B. Nephropathy - no     C.  Peripheral Neuropathy - no  Takes gabapentin for restless leg syndrome   educated on this disease and indicated improvement with good and stable glycemic control      F/U PRN    Reviewed results with patient and discussed the labs being ordered today/bnv  Patient voiced understanding of plan of care

## 2022-12-21 NOTE — PATIENT INSTRUCTIONS
SPECIFIC INSTRUCTIONS BELOW       STAY ON  METFORMIN AS BEFORE    From pcp         -------------PAY ATTENTION TO THESE GENERAL INSTRUCTIONS -----------------      - The medications prescribed at this visit will not be available at pharmacy until 6 pm       - YOUR MED LIST IS NOT UP TO DATE AS SOME CHANGES ARE BEING MADE AFTER THE VISIT - FOLLOW SPECIFIC INSTRUCTIONS  ABOVE     -ANY tests other than blood work, which you opt to do  outside the  [facility] facilities, you are responsible for prior authorizations if  required    - [address] Isaacandrea UP TO DATE ON YOUR AVS- PLEASE IGNORE     Results     *Normal results will not be notified by a phone call starting January 1 2021   *If you have an upcoming visit, the results will be discussed at the visit   *Please sign up for MY CHART if you want access to your lab and test results  *Abnormal results which require immediate attention will be notified by phone call   *Abnormal results which do not require immediate assistance will be notified in 1-2 weeks       Refills    -    have your pharmacy send us a refill request . Refills are done max for one year and a visit is a must before refills are extended    Follow up appointments -  highly encourage you to make it when you are checking out. We can accommodate you into the schedule based on your clinical situation, but not for extending refills beyond a year. Labs are important to give refills and is important to get labs before the visit     Phone calls  -  Allow  24 hrs.  for non-urgent calls to be returned  Prior authorization - It may take 2-4 weeks to process  Forms  -  FMLA, DMV etc., will take up to 2 weeks to process  Cancellations - please notify the office 2 days in advance   Samples  - will only be dispensed at visits       If not showing for the appointments and cancelling appointments within 24 hours are kept track of and three  of such situations in  two consecutive years will likely be considered for termination from the practice    -------------------------------------------------------------------------------------------------------------------

## 2022-12-21 NOTE — PROGRESS NOTES
Palak Hampton is a 69 y.o. female here for   Chief Complaint   Patient presents with    Diabetes       1. Have you been to the ER or an urgent care clinic since your last visit?  - no    2. Have you been hospitalized since your last visit? - no    3. Have you seen or consulted any other health care providers outside of the [address] [place] [address] since your last visit?   Include any pap smears or colon screening.- no

## 2023-03-21 ENCOUNTER — HOSPITAL ENCOUNTER (OUTPATIENT)
Dept: PREADMISSION TESTING | Age: 68
Discharge: HOME | End: 2023-03-21
Payer: COMMERCIAL

## 2023-03-21 LAB
ANION GAP SERPL CALC-SCNC: 0 MMOL/L (ref 5–15)
BUN SERPL-MCNC: 20 MG/DL (ref 6–20)
BUN/CREAT SERPL: 33 (ref 12–20)
CA-I BLD-MCNC: 10.1 MG/DL (ref 8.5–10.1)
CHLORIDE SERPL-SCNC: 104 MMOL/L (ref 97–108)
CO2 SERPL-SCNC: 31 MMOL/L (ref 21–32)
CREAT SERPL-MCNC: 0.6 MG/DL (ref 0.55–1.02)
ERYTHROCYTE [DISTWIDTH] IN BLOOD BY AUTOMATED COUNT: 14.7 % (ref 11.5–14.5)
GLUCOSE SERPL-MCNC: 85 MG/DL (ref 65–100)
HCT VFR BLD AUTO: 44.5 % (ref 35–47)
HGB BLD-MCNC: 14.4 G/DL (ref 11.5–16)
MCH RBC QN AUTO: 28.7 PG (ref 26–34)
MCHC RBC AUTO-ENTMCNC: 32.4 G/DL (ref 30–36.5)
MCV RBC AUTO: 88.6 FL (ref 80–99)
NRBC # BLD: 0 K/UL (ref 0–0.01)
NRBC BLD-RTO: 0 PER 100 WBC
PLATELET # BLD AUTO: 244 K/UL (ref 150–400)
PMV BLD AUTO: 10.5 FL (ref 8.9–12.9)
POTASSIUM SERPL-SCNC: 4.6 MMOL/L (ref 3.5–5.1)
RBC # BLD AUTO: 5.02 M/UL (ref 3.8–5.2)
SODIUM SERPL-SCNC: 135 MMOL/L (ref 136–145)
WBC # BLD AUTO: 7.1 K/UL (ref 3.6–11)

## 2023-03-21 PROCEDURE — 36415 COLL VENOUS BLD VENIPUNCTURE: CPT

## 2023-03-21 PROCEDURE — 80048 BASIC METABOLIC PNL TOTAL CA: CPT

## 2023-03-21 PROCEDURE — 85027 COMPLETE CBC AUTOMATED: CPT

## 2023-03-21 PROCEDURE — 93005 ELECTROCARDIOGRAM TRACING: CPT

## 2023-03-21 RX ORDER — METFORMIN HYDROCHLORIDE 500 MG/1
500 TABLET ORAL 2 TIMES DAILY WITH MEALS
COMMUNITY

## 2023-03-21 RX ORDER — ATORVASTATIN CALCIUM 10 MG/1
10 TABLET, FILM COATED ORAL
COMMUNITY

## 2023-03-22 NOTE — H&P
[facility]  HISTORY AND PHYSICAL    Name:  Clarice Khan  MR#:  [id number]  :  1955  ACCOUNT #:  [de-identified]  ADMIT DATE:  2023    OFFICE VISIT/HISTORY [facility]    Dear Dr. Flory Morelos,    The patient is a very pleasant 77-year-old female, is in the office for followup. She has multiple risk factors for coronary artery disease and complained of chest pain, and she has had a Cardiolite stress test which showed evidence of ischemia. She complained of chest pain which she described into retrosternal area and precordial area. She denied any nausea, vomiting or diaphoresis. ALLERGIES:  NONE. SOCIAL HISTORY:  Negative for smoking, drinking or drugs. 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 FAMILY HISTORY:  Mother had hypertension and diabetes and bypass surgery at the age of 61. She  at the age of 77. Brother had heart problem in the 62s, and she did not know much about her father. PAST MEDICAL HISTORY:  Remarkable for hypertension, diabetes mellitus type 2, lichen planus, hyperlipidemia, and she had problem of DVT in  when she was on birth control pill. She had undergone ablation of the left leg vein by Dr. Petar Coto. MEDICATIONS:  1. The patient is on aspirin 81 mg a day. 2.  Atorvastatin 20 mg at bedtime. 3.  Metformin 500 mg b.i.d.  4.  Amlodipine/valsartan 5/160 daily. 5.  She takes vitamin D3 supplement. 6.  Neurontin 300 mg at bedtime. REVIEW OF SYSTEMS:  Thirteen review of systems is unremarkable except for that of her symptoms of chest pain and diabetes mellitus and lichen planus and history of DVT. No history of stroke or anxiety, depression, migraine. No history of cancer. PHYSICAL EXAMINATION:  VITAL SIGNS:  The patient is 5 feet 7 inches tall, weighs 183 pounds.   Blood pressure 137/77, heart rate 64 beats per minute, and BMI is 28.74, and she is afebrile. SKIN:  Turgor is good. HEENT:  Unremarkable. Jugular pressure is normal.  No carotid bruit. LUNGS:  Clear. No rales, rhonchi or wheezing. HEART:  S1, S2 regular. No S3 or S4 gallop. ABDOMEN:  Soft, globular, nontender. EXTREMITIES:  Peripheral pulses normal.  No edema. DIAGNOSTIC DATA:  Electrocardiogram:  Sinus rhythm, poor R-wave progression. Old anteroseptal MI cannot be excluded. IMPRESSION:  1. Chest pain, need to exclude underlying ischemic heart disease. 2.  Abnormal Cardiolite stress test.  3.  Hypertension. 4.  Diabetes mellitus, type 2.  5.  Hyperlipidemia, and family history of coronary disease. PLAN:  We will consider cardiac catheterization and possible angioplasty and stent insertion. I explained the procedure and risks and benefits including risk of vascular injury, hematoma, need for packed cell transfusion, cardiac arrhythmias, contrast-related nephropathy, myocardial infarction, etc., and answered all the questions in this regard, and the patient understands and agrees. We will proceed.       Reddy Hudson MD      [id number]  D:  03/22/2023 13:33  T:  03/22/2023 15:29  JOB #:  [id number]  CC:  Cardiac Cath Lab       Leah Kehr, MD Harlen Charon, MD

## 2023-03-24 ENCOUNTER — HOSPITAL ENCOUNTER (OUTPATIENT)
Age: 68
Discharge: HOME | End: 2023-03-24
Attending: INTERNAL MEDICINE | Admitting: INTERNAL MEDICINE
Payer: COMMERCIAL

## 2023-03-24 VITALS
HEART RATE: 69 BPM | TEMPERATURE: 98.5 F | HEIGHT: 67 IN | DIASTOLIC BLOOD PRESSURE: 76 MMHG | RESPIRATION RATE: 16 BRPM | WEIGHT: 180 LBS | SYSTOLIC BLOOD PRESSURE: 150 MMHG | BODY MASS INDEX: 28.25 KG/M2 | OXYGEN SATURATION: 99 %

## 2023-03-24 DIAGNOSIS — R94.39 POSITIVE CARDIAC STRESS TEST: ICD-10-CM

## 2023-03-24 PROBLEM — R07.9 CHEST PAIN: Status: ACTIVE | Noted: 2023-03-24

## 2023-03-24 LAB
GLUCOSE BLD STRIP.AUTO-MCNC: 117 MG/DL (ref 65–100)
PERFORMED BY, TECHID: ABNORMAL

## 2023-03-24 PROCEDURE — 99153 MOD SED SAME PHYS/QHP EA: CPT | Performed by: INTERNAL MEDICINE

## 2023-03-24 PROCEDURE — 76210000027 HC REC RM PH II 3.5 TO 4 HR: Performed by: INTERNAL MEDICINE

## 2023-03-24 PROCEDURE — 74011000250 HC RX REV CODE- 250: Performed by: INTERNAL MEDICINE

## 2023-03-24 PROCEDURE — 77030004550 HC CATH ANGI DX PRF MRTM -B: Performed by: INTERNAL MEDICINE

## 2023-03-24 PROCEDURE — 77030029065 HC DRSG HEMO QCLOT ZMED -B: Performed by: INTERNAL MEDICINE

## 2023-03-24 PROCEDURE — 93458 L HRT ARTERY/VENTRICLE ANGIO: CPT | Performed by: INTERNAL MEDICINE

## 2023-03-24 PROCEDURE — 74011250636 HC RX REV CODE- 250/636: Performed by: INTERNAL MEDICINE

## 2023-03-24 PROCEDURE — C1760 CLOSURE DEV, VASC: HCPCS | Performed by: INTERNAL MEDICINE

## 2023-03-24 PROCEDURE — 76210000063 HC OR PH I REC FIRST 0.5 HR: Performed by: INTERNAL MEDICINE

## 2023-03-24 PROCEDURE — C1769 GUIDE WIRE: HCPCS | Performed by: INTERNAL MEDICINE

## 2023-03-24 PROCEDURE — 99152 MOD SED SAME PHYS/QHP 5/>YRS: CPT | Performed by: INTERNAL MEDICINE

## 2023-03-24 PROCEDURE — C1894 INTRO/SHEATH, NON-LASER: HCPCS | Performed by: INTERNAL MEDICINE

## 2023-03-24 PROCEDURE — 74011000636 HC RX REV CODE- 636: Performed by: INTERNAL MEDICINE

## 2023-03-24 PROCEDURE — 82962 GLUCOSE BLOOD TEST: CPT

## 2023-03-24 RX ORDER — SODIUM CHLORIDE 0.9 % (FLUSH) 0.9 %
5-40 SYRINGE (ML) INJECTION EVERY 8 HOURS
Status: DISCONTINUED | OUTPATIENT
Start: 2023-03-24 | End: 2023-03-24 | Stop reason: HOSPADM

## 2023-03-24 RX ORDER — SODIUM CHLORIDE 9 MG/ML
125 INJECTION, SOLUTION INTRAVENOUS CONTINUOUS
Status: DISCONTINUED | OUTPATIENT
Start: 2023-03-24 | End: 2023-03-24 | Stop reason: HOSPADM

## 2023-03-24 RX ORDER — HEPARIN SODIUM 200 [USP'U]/100ML
INJECTION, SOLUTION INTRAVENOUS
Status: COMPLETED | OUTPATIENT
Start: 2023-03-24 | End: 2023-03-24

## 2023-03-24 RX ORDER — SODIUM CHLORIDE 0.9 % (FLUSH) 0.9 %
5-40 SYRINGE (ML) INJECTION AS NEEDED
Status: DISCONTINUED | OUTPATIENT
Start: 2023-03-24 | End: 2023-03-24 | Stop reason: HOSPADM

## 2023-03-24 RX ORDER — LIDOCAINE HYDROCHLORIDE 10 MG/ML
INJECTION INFILTRATION; PERINEURAL AS NEEDED
Status: DISCONTINUED | OUTPATIENT
Start: 2023-03-24 | End: 2023-03-24 | Stop reason: HOSPADM

## 2023-03-24 RX ORDER — DIPHENHYDRAMINE HYDROCHLORIDE 50 MG/ML
INJECTION, SOLUTION INTRAMUSCULAR; INTRAVENOUS AS NEEDED
Status: DISCONTINUED | OUTPATIENT
Start: 2023-03-24 | End: 2023-03-24 | Stop reason: HOSPADM

## 2023-03-24 RX ORDER — MIDAZOLAM HYDROCHLORIDE 1 MG/ML
INJECTION INTRAMUSCULAR; INTRAVENOUS AS NEEDED
Status: DISCONTINUED | OUTPATIENT
Start: 2023-03-24 | End: 2023-03-24 | Stop reason: HOSPADM

## 2023-03-24 RX ORDER — FENTANYL CITRATE 50 UG/ML
INJECTION, SOLUTION INTRAMUSCULAR; INTRAVENOUS AS NEEDED
Status: DISCONTINUED | OUTPATIENT
Start: 2023-03-24 | End: 2023-03-24 | Stop reason: HOSPADM

## 2023-03-24 RX ADMIN — SODIUM CHLORIDE 125 ML/HR: 9 INJECTION, SOLUTION INTRAVENOUS at 07:53

## 2023-03-24 NOTE — DISCHARGE INSTRUCTIONS
Hold metformin for 48 hours after cardiac catheterization on 3-24-23. Okay to resume metformin on 3-26-23 after 10:00 a.m. [facility]  Cardiac Catheterization Department  [address] [place] [address], [address] [place] [address]  [phone]        Coronary Angiogram: What to Expect at [facility]  A coronary angiogram is a test to examine the large blood vessels of your heart (coronary arteries). The doctor inserted a thin, flexible tube (catheter into a blood vessel in your groin or wrist.  Your groin or wrist may have a bruise and feel sore for a few days after the procedure. You can do light activities around the house. But do not do anything strenuous until your doctor says it is ok. This may be for several days. This care sheet gives you a general idea about how long it will take for you to recover. But each person recovers at a different pace. Follow the steps below to feel better as quickly as possible. How can you care for yourself at home? Activity  If the doctor gave you a sedative: For 24 hours, don't do anything that requires attention to detail, such as going to work, making important decisions, or signing any legal documents. It takes time for the medicine's effects to completely wear off. For your safety, do not drive or operate any machinery that could be dangerous. Wait until the medicine wears off and you can think clearly and react easily. Do not do any strenuous exercise and do not lift, pull, or push anything heavier than 5 pounds (approximately the weight of 1 gallon of milk) until your doctor says it is ok. This may be for several days. You can walk around the house and do light activity, such as cooking. If the catheter was placed in your groin and you must use stairs, just go up and down slowly in as few trips as possible for the first couple of days.   If the catheter was placed in your wrist, do not bend your wrist deeply for the first couple of days.  A soft wrist-splint may be placed on your wrist as a reminder following the procedure. Be careful using your hand to get into and out of a chair or bed and when opening doors. Get regular exercise, after being cleared by your cardiologist.  Walking may be a good choice. Try for at least 30 minutes on most days of the week. If you smoke or use smokeless tobacco products, including vaping products, it is extremely important that you quit using these products. Please ask you nurse or doctor for more information about smoking cessation. Diet  Drink plenty of fluids to help you body flush out the dye. If you have kidney, heart, or liver disease and have to limit fluids, talk to your doctor before increasing the amount of fluids you drink. Keep eating a heart-healthy diet that has lots of fruits, vegetables, and whole grains. If you have not been eating this way, talk to your doctor. You also may want to talk to a dietician. This expert can help you to learn about healthy foods and plan meals. Medicines  Your doctor will tell you if and when you can restart your medicines. You will also be given instructions about taking any new medicines. Take these medicines as prescribed. Continue taking your cholesterol lowering medications (statins), if you have been prescribed this. You doctor may prescribe a blood-thinning medicine like aspirin or clopidogrel (Plavix), plasugrel (Effient), or ticagrelor (Brilinta). If you had angioplasty or a stent placement, you must continue aspirin and Plavix, Effient, or Brilinta. It is very important that you take these medicines exactly as directed to keep the coronary artery open and reduce your risk of heart attack. Be safe with medicines. Call your doctor if you think you are having a problem with taking or obtaining your medicines, notify your doctor immediately. You cannot afford to miss your medications.   Do not stop taking these medications without speaking to your cardiologist first.   Michelle Barrow not strain to have a bowel movement. Ask your doctor if you feel you may need a stool softener of laxative. Care of the catheter site  For 1 or 2 days, keep a bandage over the spot where the catheter(s) was inserted. The bandage probably will fall off in this time. Put ice or a cold pack on the area for 10 to 20 minutes at a time to help with soreness of swelling. Place a thin cloth between the ice and your skin. You may shower 24 to 48 hours after the procedure, if your doctor says it is okay. Pat the incision dry with a clean towel afterwards. Do not soak the catheter site until it is healed. Don't take a bath for 1 week, or until your doctor tells you it is okay to do so. The use of lotions and powders is not recommended at the site until it is completely healed. Watch for bleeding from the site. A small amount of blood (up to the size of a quarter) on the bandage can be normal.  If you cough, sneeze, or laugh vigorously, apply direct pressure with your hand over the catheter site. If you notice a little bit of blood from the catheter site (similar to a paper cut or shaving nick), lie down and press firmly on the area for 15 minutes to try and make it stop bleeding. If the bleeding does not stop, call your doctor or seek immediate medical care. If you notice heavy bleeding at the site that has either saturated the bandage or is squirting, lie down, press firmly on the site, and have someone call 911. Follow-up care is a key part of your treatment and safety. Be sure to make and go to all appointments and call your doctor if you are having problems. It's also a good idea to know your test results and keep a list of medicines you take. When should you call for help? Call 911 anytime you think you may need emergency care. For example, call if:  You passed out (lost consciousness). You have severe trouble breathing.   You have sudden chest pain and shortness of breath, or you cough up blood. Call 911 if you have symptoms of a heart attack, such as:  Chest Pain, pressure, squeezing, or burning. Sweating. Shortness of breath or difficulty breathing. Nausea or vomiting. Jaw pain, shoulder pain, or arm pain in one or both sides. Feelings of fullness. Excessive fatigue or weakness. New onset anxiety. New onset of upper back pain. Dizziness or lightheadedness. A fast or uneven pulse. Call 911 if you have been diagnosed with angina, and you have symptoms that do not go away with rest or are not getting better within 5 minutes of taking a dose of your nitroglycerin. After you call 911, the  may tell you to chew 1 adult-strength (325 mg) of 2 to 4 low-dose aspirin (81 mg). Wait for ambulance. Do not drive yourself. Call your doctor now or seek immediate medical care if:  You are bleeding from the area where the catheter was inserted. You have a fast-growing, painful lump at the catheter site. You have signs of infection, such as  Increased pain, swelling, warmth or redness at the catheter site. Red streaks leading from the catheter site. Pus draining from the catheter site. A fever. Your leg or hand is painful, looks blue, or feels cold, numb, or tingly. Watch closely for changes in your health and be sure to contact your doctor if you have any problems.

## 2023-03-24 NOTE — PERIOP NOTES
Patient alert and oriented x 4 with respirations even and unlabored on RA. Patient discharged home per physician's order. Patient and patient's spouse verbalize understanding of patient's discharge instructions. Patient transported via wheelchair to front hospital entrance with patient's spouse present to transport patient via private vehicle.

## 2023-03-24 NOTE — Clinical Note
Contrast Dose Calculator:   Patient's age: 69.   Patient's sex: Female. Patient weight (kg) = 81.6. Creatinine level (mg/dL) = 0.6. Creatinine clearance (mL/min): 114. Contrast concentration (mg/mL) = 370. MACD = 300 mL. Max Contrast dose per Creatinine Cl calculator = 256.5 mL.

## 2023-03-24 NOTE — Clinical Note
TRANSFER - OUT REPORT:     Verbal report given to: Anika Castillo, (at bedside). Report consisted of patient's Situation, Background, Assessment and   Recommendations(SBAR). Opportunity for questions and clarification was provided. Patient transported with a Registered Nurse. Patient transported to: recovery.

## 2023-05-21 RX ORDER — LORATADINE 10 MG/1
10 TABLET ORAL DAILY
COMMUNITY

## 2023-05-21 RX ORDER — ATORVASTATIN CALCIUM 10 MG/1
10 TABLET, FILM COATED ORAL NIGHTLY
COMMUNITY

## 2023-05-21 RX ORDER — GABAPENTIN 300 MG/1
300 CAPSULE ORAL EVERY EVENING
COMMUNITY

## 2023-05-21 RX ORDER — METOPROLOL SUCCINATE 25 MG/1
12.5 TABLET, EXTENDED RELEASE ORAL DAILY
COMMUNITY

## 2023-05-21 RX ORDER — AMLODIPINE AND VALSARTAN 5; 160 MG/1; MG/1
1 TABLET ORAL DAILY
COMMUNITY

## 2023-05-21 RX ORDER — ALBUTEROL SULFATE 90 UG/1
1 AEROSOL, METERED RESPIRATORY (INHALATION) EVERY 6 HOURS PRN
COMMUNITY

## 2023-05-21 RX ORDER — MONTELUKAST SODIUM 10 MG/1
10 TABLET ORAL NIGHTLY
COMMUNITY

## (undated) DEVICE — BAG BELONG PT PERS CLEAR HANDL

## (undated) DEVICE — 3M™ TEGADERM™ TRANSPARENT FILM DRESSING FRAME STYLE, 1626W, 4 IN X 4-3/4 IN (10 CM X 12 CM), 50/CT 4CT/CASE: Brand: 3M™ TEGADERM™

## (undated) DEVICE — CATH IV AUTOGRD BC BLU 22GA 25 -- INSYTE

## (undated) DEVICE — CATHETER ETER VASC OD6FR CARD 145DEG PGTL BRAID JL40 JR40 MP

## (undated) DEVICE — KIT COLON W/ 1.1OZ LUB AND 2 END

## (undated) DEVICE — NDL FLTR TIP 5 MIC 18GX1.5IN --

## (undated) DEVICE — KENDALL RADIOLUCENT FOAM MONITORING ELECTRODE -RECTANGULAR SHAPE: Brand: KENDALL

## (undated) DEVICE — SOLIDIFIER MEDC 1200ML -- CONVERT TO 356117

## (undated) DEVICE — GUIDEWIRE VASC L150CM DIA0.035IN TIP L3MM PTFE J CRV FIX

## (undated) DEVICE — DRESSING HEMOSTATIC SFT INTVENT W/O SLT DBL WRP QUIKCLOT LF

## (undated) DEVICE — ADULT SPO2 SENSOR: Brand: NELLCOR

## (undated) DEVICE — SET ADMIN 16ML TBNG L100IN 2 Y INJ SITE IV PIGGY BK DISP

## (undated) DEVICE — ANGIO-SEAL VIP VASCULAR CLOSURE DEVICE: Brand: ANGIO-SEAL

## (undated) DEVICE — BASIN EMSIS 16OZ GRAPHITE PLAS KID SHP MOLD GRAD FOR ORAL

## (undated) DEVICE — NDL PRT INJ NSAF BLNT 18GX1.5 --

## (undated) DEVICE — Device

## (undated) DEVICE — CUFF RMFG BP INF SZ 11 DISP -- LAWSON OEM ITEM 238915

## (undated) DEVICE — PINNACLE INTRODUCER SHEATH: Brand: PINNACLE

## (undated) DEVICE — SURGICAL PROCEDURE TRAY CRD CATH 3 PRT

## (undated) DEVICE — SYR 3ML LL TIP 1/10ML GRAD --

## (undated) DEVICE — 1200 GUARD II KIT W/5MM TUBE W/O VAC TUBE: Brand: GUARDIAN

## (undated) DEVICE — SYR 5ML 1/5 GRAD LL NSAF LF --